# Patient Record
Sex: MALE | URBAN - METROPOLITAN AREA
[De-identification: names, ages, dates, MRNs, and addresses within clinical notes are randomized per-mention and may not be internally consistent; named-entity substitution may affect disease eponyms.]

---

## 2020-12-08 ENCOUNTER — OFFICE VISIT (OUTPATIENT)
Dept: URBAN - METROPOLITAN AREA CLINIC 75 | Facility: CLINIC | Age: 80
End: 2020-12-08
Payer: MEDICARE

## 2020-12-08 DIAGNOSIS — E11.9 TYPE 2 DIABETES MELLITUS WITHOUT COMPLICATIONS: ICD-10-CM

## 2020-12-08 DIAGNOSIS — H47.10 PAPILLEDEMA: ICD-10-CM

## 2020-12-08 DIAGNOSIS — H25.813 COMBINED FORMS OF AGE-RELATED CATARACT, BILATERAL: Primary | ICD-10-CM

## 2020-12-08 DIAGNOSIS — H52.4 PRESBYOPIA: ICD-10-CM

## 2020-12-08 PROCEDURE — 99204 OFFICE O/P NEW MOD 45 MIN: CPT | Performed by: OPTOMETRIST

## 2020-12-08 ASSESSMENT — VISUAL ACUITY
OD: 20/25
OS: 20/30

## 2020-12-08 ASSESSMENT — KERATOMETRY
OS: 43.50
OD: 43.50

## 2020-12-08 ASSESSMENT — INTRAOCULAR PRESSURE
OD: 14
OS: 15

## 2020-12-08 NOTE — IMPRESSION/PLAN
Impression: Type 2 diabetes mellitus without complications: Q73.8 - No evidence of diabetic retinopathy seen in either eye today. Emphasized BS control and continued follow up with primary care physician. Plan: Report to be sent to pts PCP and will continue to monitor annually.

## 2020-12-08 NOTE — IMPRESSION/PLAN
Impression: Presbyopia: H52.4. - Discussed presbyopia occurs as we get older and is the inability to focus on objects (ie: accomodate) due to the loss of flexiblity of your natural lens. Reading glasses, prism glasses, bifocals, trifocals or contact lenses can be helpful. 

 Plan: New glasses rx given to patient

## 2020-12-08 NOTE — IMPRESSION/PLAN
Impression: Combined forms of age-related cataract, bilateral: H25.813 - cataracts becoming visually significant although va is improved w/ updated refraction today. Discussed signs and symptoms of cataract progression. Plan: Pt elects to observe for now and can re-eval in 6 months.  may consider surgical intervention if difficulty w/ va progresses